# Patient Record
Sex: MALE | Race: WHITE | NOT HISPANIC OR LATINO | Employment: OTHER | URBAN - METROPOLITAN AREA
[De-identification: names, ages, dates, MRNs, and addresses within clinical notes are randomized per-mention and may not be internally consistent; named-entity substitution may affect disease eponyms.]

---

## 2017-03-07 ENCOUNTER — OFFICE VISIT - HEALTHEAST (OUTPATIENT)
Dept: CARDIOLOGY | Facility: CLINIC | Age: 72
End: 2017-03-07

## 2017-03-07 DIAGNOSIS — I25.10 CORONARY ARTERY DISEASE INVOLVING NATIVE CORONARY ARTERY OF NATIVE HEART WITHOUT ANGINA PECTORIS: ICD-10-CM

## 2017-03-07 ASSESSMENT — MIFFLIN-ST. JEOR: SCORE: 1494.69

## 2017-04-07 ENCOUNTER — OFFICE VISIT - HEALTHEAST (OUTPATIENT)
Dept: FAMILY MEDICINE | Facility: CLINIC | Age: 72
End: 2017-04-07

## 2017-04-07 DIAGNOSIS — C61 PROSTATE CANCER (H): ICD-10-CM

## 2017-04-07 DIAGNOSIS — Z00.00 HEALTH CARE MAINTENANCE: ICD-10-CM

## 2017-04-07 DIAGNOSIS — I25.10 CORONARY ARTERY DISEASE INVOLVING NATIVE CORONARY ARTERY OF NATIVE HEART WITHOUT ANGINA PECTORIS: ICD-10-CM

## 2017-04-07 DIAGNOSIS — K21.9 GASTROESOPHAGEAL REFLUX DISEASE WITHOUT ESOPHAGITIS: ICD-10-CM

## 2017-04-07 LAB
CHOLEST SERPL-MCNC: 133 MG/DL
FASTING STATUS PATIENT QL REPORTED: YES
HDLC SERPL-MCNC: 45 MG/DL
LDLC SERPL CALC-MCNC: 67 MG/DL
TRIGL SERPL-MCNC: 105 MG/DL

## 2017-04-07 ASSESSMENT — MIFFLIN-ST. JEOR: SCORE: 1464.07

## 2017-04-24 ENCOUNTER — RECORDS - HEALTHEAST (OUTPATIENT)
Dept: ADMINISTRATIVE | Facility: OTHER | Age: 72
End: 2017-04-24

## 2017-07-17 ENCOUNTER — COMMUNICATION - HEALTHEAST (OUTPATIENT)
Dept: FAMILY MEDICINE | Facility: CLINIC | Age: 72
End: 2017-07-17

## 2017-07-17 DIAGNOSIS — E78.5 HYPERLIPEMIA: ICD-10-CM

## 2017-08-31 ENCOUNTER — RECORDS - HEALTHEAST (OUTPATIENT)
Dept: ADMINISTRATIVE | Facility: OTHER | Age: 72
End: 2017-08-31

## 2017-10-17 ENCOUNTER — RECORDS - HEALTHEAST (OUTPATIENT)
Dept: ADMINISTRATIVE | Facility: OTHER | Age: 72
End: 2017-10-17

## 2017-10-20 ENCOUNTER — AMBULATORY - HEALTHEAST (OUTPATIENT)
Dept: NURSING | Facility: CLINIC | Age: 72
End: 2017-10-20

## 2017-10-26 ENCOUNTER — RECORDS - HEALTHEAST (OUTPATIENT)
Dept: ADMINISTRATIVE | Facility: OTHER | Age: 72
End: 2017-10-26

## 2018-01-25 ENCOUNTER — COMMUNICATION - HEALTHEAST (OUTPATIENT)
Dept: ADMINISTRATIVE | Facility: CLINIC | Age: 73
End: 2018-01-25

## 2018-04-02 ENCOUNTER — OFFICE VISIT - HEALTHEAST (OUTPATIENT)
Dept: CARDIOLOGY | Facility: CLINIC | Age: 73
End: 2018-04-02

## 2018-04-02 DIAGNOSIS — I25.10 CORONARY ARTERY DISEASE INVOLVING NATIVE CORONARY ARTERY OF NATIVE HEART WITHOUT ANGINA PECTORIS: ICD-10-CM

## 2018-04-02 ASSESSMENT — MIFFLIN-ST. JEOR: SCORE: 1491.28

## 2018-04-10 ENCOUNTER — OFFICE VISIT - HEALTHEAST (OUTPATIENT)
Dept: FAMILY MEDICINE | Facility: CLINIC | Age: 73
End: 2018-04-10

## 2018-04-10 DIAGNOSIS — Z00.00 ROUTINE ADULT HEALTH MAINTENANCE: ICD-10-CM

## 2018-04-10 DIAGNOSIS — I25.10 CORONARY ARTERY DISEASE INVOLVING NATIVE CORONARY ARTERY OF NATIVE HEART WITHOUT ANGINA PECTORIS: ICD-10-CM

## 2018-04-10 DIAGNOSIS — G24.5 EYE TWITCH: ICD-10-CM

## 2018-04-10 DIAGNOSIS — K21.9 GASTROESOPHAGEAL REFLUX DISEASE WITHOUT ESOPHAGITIS: ICD-10-CM

## 2018-04-10 DIAGNOSIS — E78.5 HYPERLIPIDEMIA: ICD-10-CM

## 2018-04-10 DIAGNOSIS — Z00.01 ENCOUNTER FOR GENERAL ADULT MEDICAL EXAMINATION WITH ABNORMAL FINDINGS: ICD-10-CM

## 2018-04-10 DIAGNOSIS — E78.5 HYPERLIPEMIA: ICD-10-CM

## 2018-04-10 LAB
ALBUMIN SERPL-MCNC: 4 G/DL (ref 3.5–5)
ALP SERPL-CCNC: 64 U/L (ref 45–120)
ALT SERPL W P-5'-P-CCNC: 26 U/L (ref 0–45)
ANION GAP SERPL CALCULATED.3IONS-SCNC: 10 MMOL/L (ref 5–18)
AST SERPL W P-5'-P-CCNC: 26 U/L (ref 0–40)
BILIRUB SERPL-MCNC: 1 MG/DL (ref 0–1)
BUN SERPL-MCNC: 17 MG/DL (ref 8–28)
CALCIUM SERPL-MCNC: 9.6 MG/DL (ref 8.5–10.5)
CHLORIDE BLD-SCNC: 103 MMOL/L (ref 98–107)
CHOLEST SERPL-MCNC: 146 MG/DL
CO2 SERPL-SCNC: 27 MMOL/L (ref 22–31)
CREAT SERPL-MCNC: 0.94 MG/DL (ref 0.7–1.3)
ERYTHROCYTE [DISTWIDTH] IN BLOOD BY AUTOMATED COUNT: 11.4 % (ref 11–14.5)
FASTING STATUS PATIENT QL REPORTED: YES
GFR SERPL CREATININE-BSD FRML MDRD: >60 ML/MIN/1.73M2
GLUCOSE BLD-MCNC: 103 MG/DL (ref 70–125)
HCT VFR BLD AUTO: 48.2 % (ref 40–54)
HDLC SERPL-MCNC: 45 MG/DL
HGB BLD-MCNC: 16.4 G/DL (ref 14–18)
LDLC SERPL CALC-MCNC: 72 MG/DL
MCH RBC QN AUTO: 31.5 PG (ref 27–34)
MCHC RBC AUTO-ENTMCNC: 34.1 G/DL (ref 32–36)
MCV RBC AUTO: 92 FL (ref 80–100)
PLATELET # BLD AUTO: 198 THOU/UL (ref 140–440)
PMV BLD AUTO: 7.9 FL (ref 7–10)
POTASSIUM BLD-SCNC: 4 MMOL/L (ref 3.5–5)
PROT SERPL-MCNC: 7.1 G/DL (ref 6–8)
RBC # BLD AUTO: 5.21 MILL/UL (ref 4.4–6.2)
SODIUM SERPL-SCNC: 140 MMOL/L (ref 136–145)
TRIGL SERPL-MCNC: 144 MG/DL
WBC: 7.4 THOU/UL (ref 4–11)

## 2018-04-10 ASSESSMENT — MIFFLIN-ST. JEOR: SCORE: 1469.32

## 2018-04-23 ENCOUNTER — RECORDS - HEALTHEAST (OUTPATIENT)
Dept: ADMINISTRATIVE | Facility: OTHER | Age: 73
End: 2018-04-23

## 2018-10-22 ENCOUNTER — RECORDS - HEALTHEAST (OUTPATIENT)
Dept: ADMINISTRATIVE | Facility: OTHER | Age: 73
End: 2018-10-22

## 2018-10-26 ENCOUNTER — AMBULATORY - HEALTHEAST (OUTPATIENT)
Dept: NURSING | Facility: CLINIC | Age: 73
End: 2018-10-26

## 2018-10-29 ENCOUNTER — RECORDS - HEALTHEAST (OUTPATIENT)
Dept: ADMINISTRATIVE | Facility: OTHER | Age: 73
End: 2018-10-29

## 2019-01-29 ENCOUNTER — COMMUNICATION - HEALTHEAST (OUTPATIENT)
Dept: FAMILY MEDICINE | Facility: CLINIC | Age: 74
End: 2019-01-29

## 2019-01-29 DIAGNOSIS — E78.5 HYPERLIPEMIA: ICD-10-CM

## 2019-04-12 ENCOUNTER — OFFICE VISIT - HEALTHEAST (OUTPATIENT)
Dept: FAMILY MEDICINE | Facility: CLINIC | Age: 74
End: 2019-04-12

## 2019-04-12 DIAGNOSIS — Z00.00 ROUTINE HISTORY AND PHYSICAL EXAMINATION OF ADULT: ICD-10-CM

## 2019-04-12 DIAGNOSIS — Z12.11 SCREEN FOR COLON CANCER: ICD-10-CM

## 2019-04-12 DIAGNOSIS — Z12.5 ENCOUNTER FOR SCREENING FOR MALIGNANT NEOPLASM OF PROSTATE: ICD-10-CM

## 2019-04-12 DIAGNOSIS — E78.5 HYPERLIPEMIA: ICD-10-CM

## 2019-04-12 DIAGNOSIS — C61 PROSTATE CANCER (H): ICD-10-CM

## 2019-04-12 LAB
ALBUMIN SERPL-MCNC: 3.9 G/DL (ref 3.5–5)
ALP SERPL-CCNC: 66 U/L (ref 45–120)
ALT SERPL W P-5'-P-CCNC: 23 U/L (ref 0–45)
ANION GAP SERPL CALCULATED.3IONS-SCNC: 9 MMOL/L (ref 5–18)
AST SERPL W P-5'-P-CCNC: 26 U/L (ref 0–40)
BILIRUB SERPL-MCNC: 0.7 MG/DL (ref 0–1)
BUN SERPL-MCNC: 15 MG/DL (ref 8–28)
CALCIUM SERPL-MCNC: 9.6 MG/DL (ref 8.5–10.5)
CHLORIDE BLD-SCNC: 107 MMOL/L (ref 98–107)
CHOLEST SERPL-MCNC: 113 MG/DL
CO2 SERPL-SCNC: 26 MMOL/L (ref 22–31)
CREAT SERPL-MCNC: 0.94 MG/DL (ref 0.7–1.3)
ERYTHROCYTE [DISTWIDTH] IN BLOOD BY AUTOMATED COUNT: 10.9 % (ref 11–14.5)
FASTING STATUS PATIENT QL REPORTED: YES
GFR SERPL CREATININE-BSD FRML MDRD: >60 ML/MIN/1.73M2
GLUCOSE BLD-MCNC: 101 MG/DL (ref 70–125)
HCT VFR BLD AUTO: 44.4 % (ref 40–54)
HDLC SERPL-MCNC: 38 MG/DL
HGB BLD-MCNC: 15.2 G/DL (ref 14–18)
LDLC SERPL CALC-MCNC: 58 MG/DL
MCH RBC QN AUTO: 31.8 PG (ref 27–34)
MCHC RBC AUTO-ENTMCNC: 34.3 G/DL (ref 32–36)
MCV RBC AUTO: 93 FL (ref 80–100)
PLATELET # BLD AUTO: 185 THOU/UL (ref 140–440)
PMV BLD AUTO: 8.1 FL (ref 7–10)
POTASSIUM BLD-SCNC: 4.5 MMOL/L (ref 3.5–5)
PROT SERPL-MCNC: 6.7 G/DL (ref 6–8)
PSA SERPL-MCNC: <0.1 NG/ML (ref 0–6.5)
RBC # BLD AUTO: 4.79 MILL/UL (ref 4.4–6.2)
SODIUM SERPL-SCNC: 142 MMOL/L (ref 136–145)
TRIGL SERPL-MCNC: 83 MG/DL
WBC: 5.4 THOU/UL (ref 4–11)

## 2019-04-12 ASSESSMENT — MIFFLIN-ST. JEOR: SCORE: 1473.14

## 2019-04-22 ENCOUNTER — RECORDS - HEALTHEAST (OUTPATIENT)
Dept: ADMINISTRATIVE | Facility: OTHER | Age: 74
End: 2019-04-22

## 2019-04-22 LAB — COLOGUARD-ABSTRACT: NEGATIVE

## 2019-05-10 ENCOUNTER — RECORDS - HEALTHEAST (OUTPATIENT)
Dept: HEALTH INFORMATION MANAGEMENT | Facility: CLINIC | Age: 74
End: 2019-05-10

## 2019-09-26 ENCOUNTER — AMBULATORY - HEALTHEAST (OUTPATIENT)
Dept: NURSING | Facility: CLINIC | Age: 74
End: 2019-09-26

## 2020-05-04 ENCOUNTER — COMMUNICATION - HEALTHEAST (OUTPATIENT)
Dept: FAMILY MEDICINE | Facility: CLINIC | Age: 75
End: 2020-05-04

## 2020-05-04 ENCOUNTER — COMMUNICATION - HEALTHEAST (OUTPATIENT)
Dept: ADMINISTRATIVE | Facility: CLINIC | Age: 75
End: 2020-05-04

## 2020-05-04 DIAGNOSIS — E78.5 HYPERLIPEMIA: ICD-10-CM

## 2020-05-07 ENCOUNTER — AMBULATORY - HEALTHEAST (OUTPATIENT)
Dept: FAMILY MEDICINE | Facility: CLINIC | Age: 75
End: 2020-05-07

## 2020-05-07 ENCOUNTER — COMMUNICATION - HEALTHEAST (OUTPATIENT)
Dept: FAMILY MEDICINE | Facility: CLINIC | Age: 75
End: 2020-05-07

## 2020-05-07 DIAGNOSIS — E78.5 HYPERLIPEMIA: ICD-10-CM

## 2020-08-12 ENCOUNTER — OFFICE VISIT - HEALTHEAST (OUTPATIENT)
Dept: FAMILY MEDICINE | Facility: CLINIC | Age: 75
End: 2020-08-12

## 2020-08-12 DIAGNOSIS — Z12.5 SCREENING FOR PROSTATE CANCER: ICD-10-CM

## 2020-08-12 DIAGNOSIS — I25.10 CORONARY ARTERY DISEASE INVOLVING NATIVE HEART WITHOUT ANGINA PECTORIS, UNSPECIFIED VESSEL OR LESION TYPE: ICD-10-CM

## 2020-08-12 DIAGNOSIS — K21.9 GASTROESOPHAGEAL REFLUX DISEASE, ESOPHAGITIS PRESENCE NOT SPECIFIED: ICD-10-CM

## 2020-08-12 DIAGNOSIS — C61 PROSTATE CANCER (H): ICD-10-CM

## 2020-08-12 DIAGNOSIS — I25.10 CORONARY ARTERY DISEASE INVOLVING NATIVE CORONARY ARTERY: ICD-10-CM

## 2020-08-12 DIAGNOSIS — Z00.00 ROUTINE HISTORY AND PHYSICAL EXAMINATION OF ADULT: ICD-10-CM

## 2020-08-12 DIAGNOSIS — M25.562 LEFT KNEE PAIN, UNSPECIFIED CHRONICITY: ICD-10-CM

## 2020-08-12 LAB
ALBUMIN SERPL-MCNC: 4.3 G/DL (ref 3.5–5)
ALP SERPL-CCNC: 66 U/L (ref 45–120)
ALT SERPL W P-5'-P-CCNC: 24 U/L (ref 0–45)
ANION GAP SERPL CALCULATED.3IONS-SCNC: 13 MMOL/L (ref 5–18)
AST SERPL W P-5'-P-CCNC: 25 U/L (ref 0–40)
BILIRUB SERPL-MCNC: 0.8 MG/DL (ref 0–1)
BUN SERPL-MCNC: 17 MG/DL (ref 8–28)
CALCIUM SERPL-MCNC: 9.8 MG/DL (ref 8.5–10.5)
CHLORIDE BLD-SCNC: 103 MMOL/L (ref 98–107)
CHOLEST SERPL-MCNC: 139 MG/DL
CO2 SERPL-SCNC: 23 MMOL/L (ref 22–31)
CREAT SERPL-MCNC: 1.01 MG/DL (ref 0.7–1.3)
ERYTHROCYTE [DISTWIDTH] IN BLOOD BY AUTOMATED COUNT: 11.3 % (ref 11–14.5)
FASTING STATUS PATIENT QL REPORTED: YES
GFR SERPL CREATININE-BSD FRML MDRD: >60 ML/MIN/1.73M2
GLUCOSE BLD-MCNC: 98 MG/DL (ref 70–125)
HCT VFR BLD AUTO: 45.8 % (ref 40–54)
HDLC SERPL-MCNC: 54 MG/DL
HGB BLD-MCNC: 15.6 G/DL (ref 14–18)
LDLC SERPL CALC-MCNC: 65 MG/DL
MCH RBC QN AUTO: 31.9 PG (ref 27–34)
MCHC RBC AUTO-ENTMCNC: 34 G/DL (ref 32–36)
MCV RBC AUTO: 94 FL (ref 80–100)
PLATELET # BLD AUTO: 181 THOU/UL (ref 140–440)
PMV BLD AUTO: 7.7 FL (ref 7–10)
POTASSIUM BLD-SCNC: 5.2 MMOL/L (ref 3.5–5)
PROT SERPL-MCNC: 7 G/DL (ref 6–8)
PSA SERPL-MCNC: <0.1 NG/ML (ref 0–6.5)
RBC # BLD AUTO: 4.88 MILL/UL (ref 4.4–6.2)
SODIUM SERPL-SCNC: 139 MMOL/L (ref 136–145)
TRIGL SERPL-MCNC: 102 MG/DL
WBC: 6.9 THOU/UL (ref 4–11)

## 2020-08-12 RX ORDER — NITROGLYCERIN 0.4 MG/1
0.4 TABLET SUBLINGUAL EVERY 5 MIN PRN
Qty: 90 TABLET | Refills: 0 | Status: SHIPPED | OUTPATIENT
Start: 2020-08-12

## 2020-08-12 ASSESSMENT — ANXIETY QUESTIONNAIRES
1. FEELING NERVOUS, ANXIOUS, OR ON EDGE: NOT AT ALL
2. NOT BEING ABLE TO STOP OR CONTROL WORRYING: NOT AT ALL

## 2020-08-12 ASSESSMENT — MIFFLIN-ST. JEOR: SCORE: 1440.71

## 2020-09-24 ENCOUNTER — AMBULATORY - HEALTHEAST (OUTPATIENT)
Dept: NURSING | Facility: CLINIC | Age: 75
End: 2020-09-24

## 2020-09-24 DIAGNOSIS — Z23 NEED FOR VACCINATION: ICD-10-CM

## 2020-09-24 DIAGNOSIS — Z23 NEED FOR PROPHYLACTIC VACCINATION AND INOCULATION AGAINST CHOLERA ALONE: ICD-10-CM

## 2020-11-30 ENCOUNTER — COMMUNICATION - HEALTHEAST (OUTPATIENT)
Dept: FAMILY MEDICINE | Facility: CLINIC | Age: 75
End: 2020-11-30

## 2020-11-30 DIAGNOSIS — E78.5 HYPERLIPEMIA: ICD-10-CM

## 2020-11-30 RX ORDER — ATORVASTATIN CALCIUM 40 MG/1
TABLET, FILM COATED ORAL
Qty: 90 TABLET | Refills: 2 | Status: SHIPPED | OUTPATIENT
Start: 2020-11-30 | End: 2021-08-13

## 2021-05-27 NOTE — PATIENT INSTRUCTIONS - HE
Advance Directive  Patient s advance directive was discussed and I am comfortable with the patient s wishes.  Patient Education   Personalized Prevention Plan  You are due for the preventive services outlined below.  Your care team is available to assist you in scheduling these services.  If you have already completed any of these items, please share that information with your care team to update in your medical record.  Health Maintenance   Topic Date Due     ZOSTER VACCINES (2 of 3) 04/30/2008     FALL RISK ASSESSMENT  04/10/2019     ADVANCE DIRECTIVES DISCUSSED WITH PATIENT  04/10/2023     TD 18+ HE  12/19/2024     COLONOSCOPY  08/31/2027     PNEUMOCOCCAL POLYSACCHARIDE VACCINE AGE 65 AND OVER  Completed     INFLUENZA VACCINE RULE BASED  Completed     PNEUMOCOCCAL CONJUGATE VACCINE FOR ADULTS (PCV13 OR PREVNAR)  Completed

## 2021-05-27 NOTE — PROGRESS NOTES
Assessment and Plan:       1. Routine history and physical examination of adult  Patient is fasting today and interested in fasting labs  He is up-to-date on colon cancer screening but interested in genetic testing  Is up-to-date on immunizations  He will check with insurance on coverage for the shingles vaccine  - Lipid Cascade  - Comprehensive Metabolic Panel  - HM2(CBC w/o Differential)  - PSA (Prostatic-Specific Antigen), Annual Screen    2. Encounter for screening for malignant neoplasm of prostate   Patient has a history of prostate cancer and is 5 years out from prostatectomy  We will continue to monitor PSA for verification that remains undetectable  - PSA (Prostatic-Specific Antigen), Annual Screen    3. Screen for colon cancer  Patient had a colonoscopy 2 years ago and is due for repeat colonoscopy next year  He like to pursue genetic testing instead of colonoscopy and we will try cologuard  - Cologuard    4. Hyperlipemia  Patient has a history of mixed hyperlipidemia  He is on atorvastatin and tolerating the check cholesterol levels and follow-up based on results  - atorvastatin (LIPITOR) 40 MG tablet; Take 1 tablet (40 mg total) by mouth at bedtime.  Dispense: 90 tablet; Refill: 3    5. Prostate cancer (H)  Patient has been following with urology over the last 5 years since surgery  We will check PSA for monitoring    The patient's current medical problems were reviewed.    The following high BMI interventions were performed this visit: encouragement to exercise  The following health maintenance schedule was reviewed with the patient and provided in printed form in the after visit summary:   Health Maintenance   Topic Date Due     ZOSTER VACCINES (2 of 3) 04/30/2008     FALL RISK ASSESSMENT  04/10/2019     ADVANCE DIRECTIVES DISCUSSED WITH PATIENT  04/10/2023     TD 18+ HE  12/19/2024     COLONOSCOPY  08/31/2027     PNEUMOCOCCAL POLYSACCHARIDE VACCINE AGE 65 AND OVER  Completed     INFLUENZA VACCINE RULE  BASED  Completed     PNEUMOCOCCAL CONJUGATE VACCINE FOR ADULTS (PCV13 OR PREVNAR)  Completed        Subjective:   Chief Complaint: Javier Johnson is an 74 y.o. male here for an Annual Wellness visit.   HPI: Patient is here for annual exam  Patient has no acute concerns  Is up-to-date on immunizations and will check with insurance on coverage for the shingles vaccine  Patient is on statin therapy and doing well with the medication we will check fasting labs today  Is up-to-date on colon cancer screening  Patient is exercising regularly  Patient spends his winter months in [place]  Patient recently has bought a new bike and plans on doing a lot of biking this summer    Review of Systems:   Please see above.  The rest of the review of systems are negative for all systems.    Patient Care Team:  Reza Yang MD as PCP - General     Patient Active Problem List   Diagnosis     Hyperplastic Polyp Of The Large Intestine     Hyperlipemia     Varicose Veins     Esophageal Reflux     Diverticulosis     Benign Prostatic Hypertrophy     Midback Pain     Nicotine Dependence - In Remission     Lower Back Pain     Inguinal Hernia     Prostate cancer (H)     Coronary artery disease involving native coronary artery     Abnormal stress test     Past Medical History:   Diagnosis Date     Cancer (H)      Exertional chest pain      GERD (gastroesophageal reflux disease)      Hyperlipidemia      Prostate cancer (H)       Past Surgical History:   Procedure Laterality Date     CARDIAC CATHETERIZATION  08/03/2016     CORONARY STENT PLACEMENT  08/03/2016    2 klaus to om     HERNIA REPAIR       PROSTATE SURGERY        Family History   Problem Relation Age of Onset     Stroke Mother      Coronary artery disease Father       Social History     Socioeconomic History     Marital status:      Spouse name: Not on file     Number of children: Not on file     Years of education: Not on file     Highest education level: Not on  file   Occupational History     Not on file   Social Needs     Financial resource strain: Not on file     Food insecurity:     Worry: Not on file     Inability: Not on file     Transportation needs:     Medical: Not on file     Non-medical: Not on file   Tobacco Use     Smoking status: Former Smoker     Last attempt to quit: 1991     Years since quittin.2     Smokeless tobacco: Never Used     Tobacco comment: quit    Substance and Sexual Activity     Alcohol use: Yes     Alcohol/week: 3.0 oz     Types: 1 Glasses of wine, 2 Cans of beer, 2 Standard drinks or equivalent per week     Drug use: No     Sexual activity: Not on file   Lifestyle     Physical activity:     Days per week: Not on file     Minutes per session: Not on file     Stress: Not on file   Relationships     Social connections:     Talks on phone: Not on file     Gets together: Not on file     Attends Sikhism service: Not on file     Active member of club or organization: Not on file     Attends meetings of clubs or organizations: Not on file     Relationship status: Not on file     Intimate partner violence:     Fear of current or ex partner: Not on file     Emotionally abused: Not on file     Physically abused: Not on file     Forced sexual activity: Not on file   Other Topics Concern     Not on file   Social History Narrative     Not on file      Current Outpatient Medications   Medication Sig Dispense Refill     ascorbic acid (ASCORBIC ACID WITH ARTUR HIPS) 500 MG tablet Take 1,000 mg by mouth.        aspirin 81 MG EC tablet Take 81 mg by mouth bedtime.        atorvastatin (LIPITOR) 40 MG tablet Take 1 tablet (40 mg total) by mouth at bedtime. 90 tablet 3     cholecalciferol, vitamin D3, 2,000 unit cap Take by mouth daily.        GLUCOSAMINE SULFATE (GLUCOSAMINE ORAL) Take 1 tablet by mouth daily.        MULTIVIT &MINERALS/FERROUS FUM (MULTI VITAMIN ORAL) Take 1 tablet by mouth daily.       nitroglycerin (NITROSTAT) 0.4 MG SL tablet  "Place 1 tablet (0.4 mg total) under the tongue every 5 (five) minutes as needed for chest pain. 90 tablet 12     omeprazole (PRILOSEC OTC) 20 MG tablet Take 20 mg by mouth daily.       No current facility-administered medications for this visit.       Objective:   Vital Signs:   Visit Vitals  /73 (Patient Site: Left Arm, Patient Position: Sitting, Cuff Size: Adult Regular)   Pulse 74   Temp 98  F (36.7  C) (Oral)   Resp 18   Ht 5' 10\" (1.778 m)   Wt 160 lb 4 oz (72.7 kg)   BMI 22.99 kg/m         VisionScreening:  No exam data present     PHYSICAL EXAM  Physical Examination: General appearance - alert, well appearing, and in no distress  Mental status - alert, oriented to person, place, and time  Ears - bilateral TM's and external ear canals normal  Mouth - mucous membranes moist, pharynx normal without lesions  Chest - clear to auscultation, no wheezes, rales or rhonchi, symmetric air entry  Heart - normal rate, regular rhythm, normal S1, S2, no murmurs, rubs, clicks or gallops  Abdomen - soft, nontender, nondistended, no masses or organomegaly  Back exam - full range of motion, no tenderness, palpable spasm or pain on motion  Neurological - alert, oriented, normal speech, no focal findings or movement disorder noted  Musculoskeletal - no joint tenderness, deformity or swelling  Extremities - peripheral pulses normal, no pedal edema, no clubbing or cyanosis  Skin - normal coloration and turgor, no rashes, no suspicious skin lesions noted      Assessment Results 4/12/2019   Activities of Daily Living No help needed   Instrumental Activities of Daily Living No help needed   Mini Cog Total Score 5   Some recent data might be hidden     A Mini-Cog score of 0-2 suggests the possibility of dementia, score of 3-5 suggests no dementia    Identified Health Risks:     Patient's advanced directive was discussed and I am comfortable with the patient's wishes.        "

## 2021-05-30 VITALS — WEIGHT: 165 LBS | BODY MASS INDEX: 23.62 KG/M2 | HEIGHT: 70 IN

## 2021-05-30 VITALS — WEIGHT: 160 LBS | HEIGHT: 70 IN | BODY MASS INDEX: 22.9 KG/M2

## 2021-06-01 VITALS — BODY MASS INDEX: 23.88 KG/M2 | HEIGHT: 69 IN | WEIGHT: 161.2 LBS

## 2021-06-01 VITALS — BODY MASS INDEX: 23.77 KG/M2 | WEIGHT: 166 LBS | HEIGHT: 70 IN

## 2021-06-02 VITALS — HEIGHT: 70 IN | WEIGHT: 160.25 LBS | BODY MASS INDEX: 22.94 KG/M2

## 2021-06-04 VITALS
RESPIRATION RATE: 14 BRPM | HEIGHT: 69 IN | DIASTOLIC BLOOD PRESSURE: 82 MMHG | BODY MASS INDEX: 23.19 KG/M2 | WEIGHT: 156.6 LBS | TEMPERATURE: 96.9 F | HEART RATE: 76 BPM | SYSTOLIC BLOOD PRESSURE: 144 MMHG

## 2021-06-07 NOTE — TELEPHONE ENCOUNTER
Refill Given    Refill given per Policy, patient informed they are overdue for Office Visit    Kristel Schmid, [facility] Connection Triage/Med Refill 5/6/2020    Requested Prescriptions   Pending Prescriptions Disp Refills     atorvastatin (LIPITOR) 40 MG tablet [Pharmacy Med Name: Atorvastatin Calcium 40 MG Oral Tablet] 90 tablet 0     Sig: TAKE 1 TABLET BY MOUTH AT BEDTIME       Statins Refill Protocol (Hmg CoA Reductase Inhibitors) Failed - 5/4/2020  9:22 AM        Failed - PCP or prescribing provider visit in past 12 months      Last office visit with prescriber/PCP: 8/11/2016 Reza Yang MD OR same dept: Visit date not found OR same specialty: 8/11/2016 Reza Yang MD  Last physical: 4/12/2019 Last MTM visit: Visit date not found   Next visit within 3 mo: Visit date not found  Next physical within 3 mo: Visit date not found  Prescriber OR PCP: Reza Yang MD  Last diagnosis associated with med order: 1. Hyperlipemia  - atorvastatin (LIPITOR) 40 MG tablet [Pharmacy Med Name: Atorvastatin Calcium 40 MG Oral Tablet]; TAKE 1 TABLET BY MOUTH AT BEDTIME  Dispense: 90 tablet; Refill: 0    If protocol passes may refill for 12 months if within 3 months of last provider visit (or a total of 15 months).

## 2021-06-08 NOTE — TELEPHONE ENCOUNTER
See medication refill encounter from yesterday (05/04/20)    Who is calling:  patient  Reason for Call:  States he was given a one month refill on his medication and since pcp's schedule has no openings for a telephone or clinic visit would pcp ok refills until his appointment in August for his annual wellness visit. If not please work into pcp's schedule and call patient.   Date of last appointment with primary care: 04/12/2019  Okay to leave a detailed message: Yes

## 2021-06-09 NOTE — PROGRESS NOTES
"Cardiology Progress Note    Assessment:  Coronary artery disease status post obtuse marginal stenting in August 2016, stable, no angina  Hyperlipidemia, on high-dose statin      Plan:  Lipid profile today    He can stop metoprolol    Follow-up in 1 year    Subjective:   This is 72 y.o. male who comes in today for follow-up visit.  He has done well since last time I saw him.  He has been physically active he denies chest pain or shortness of breath.  He denies any new cardiac problems.    Review of Systems:   General: WNL  Eyes: WNL  Ears/Nose/Throat: WNL  Lungs: WNL  Heart: WNL  Stomach: WNL  Bladder: WNL  Muscle/Joints: WNL  Skin: WNL  Nervous System: WNL  Mental Health: WNL     Blood: WNL    Objective:   Visit Vitals     /72 (Patient Site: Right Arm, Patient Position: Sitting, Cuff Size: Adult Regular)     Pulse 80     Resp 16     Ht 5' 10\" (1.778 m)     Wt 165 lb (74.8 kg)     BMI 23.68 kg/m2     Physical Exam:  GENERAL: no distress  NECK: No JVD  LUNGS: Clear to auscultation.  CARDIAC: regular rhythm, S1 & S2 normal.  No heaves, thrills, gallops or murmurs.  ABDOMEN: flat, negative hepatosplenomegaly, soft and non-tender.  EXTREMITIES: No evidence of cyanosis, clubbing or edema.    Current Outpatient Prescriptions   Medication Sig Dispense Refill     ascorbic acid (ASCORBIC ACID WITH ARTUR HIPS) 500 MG tablet Take 1,000 mg by mouth.        aspirin 81 MG EC tablet Take 81 mg by mouth bedtime.        atorvastatin (LIPITOR) 40 MG tablet Take 1 tablet (40 mg total) by mouth bedtime. 30 tablet 11     cholecalciferol, vitamin D3, 2,000 unit cap Take by mouth daily.        GLUCOSAMINE SULFATE (GLUCOSAMINE ORAL) Take 1 tablet by mouth daily.        MULTIVIT &MINERALS/FERROUS FUM (MULTI VITAMIN ORAL) Take 1 tablet by mouth daily.       nitroglycerin (NITROSTAT) 0.4 MG SL tablet Place 1 tablet (0.4 mg total) under the tongue every 5 (five) minutes as needed for chest pain. 90 tablet 12     OMEGA-3/DHA/EPA/FISH OIL " (FISH OIL-OMEGA-3 FATTY ACIDS) 300-1,000 mg capsule Take 1,000 mg by mouth 2 (two) times a day.        omeprazole (PRILOSEC OTC) 20 MG tablet Take 20 mg by mouth daily.       No current facility-administered medications for this visit.        Cardiographics:      Coronary angio: August 2016 1v CAD.  99% OM1 stenosis with CINTHYA grade 2 flow.  Normal LV systolic function with a visually estimated EF  60-65%.  Mild Mitral regurgitation.  Inferolateral hypokinesis.  Successful Synergy KEN stent of OM1.  Lab Results:       Lab Results   Component Value Date    CHOL 133 10/27/2016    CHOL 154 08/04/2016    CHOL 175 04/06/2016     Lab Results   Component Value Date    HDL 45 10/27/2016    HDL 40 08/04/2016    HDL 41 04/06/2016     Lab Results   Component Value Date    LDLCALC 69 10/27/2016    LDLCALC 86 08/04/2016    LDLCALC 112 04/06/2016     Lab Results   Component Value Date    TRIG 94 10/27/2016    TRIG 140 08/04/2016    TRIG 109 04/06/2016     No components found for: CHOLHDL  No results found for: BNP    Barrie (Ryan)  MD Jaylen

## 2021-06-09 NOTE — PROGRESS NOTES
ASSESSMENT & PLAN:    1. Health care maintenance  Check labs today and follow-up based on results  Continue with regular exercise  Reviewed recent cardiology notes  Due for colonoscopy in the next year  - Lipid Cascade  - Comprehensive Metabolic Panel  - HM2(CBC w/o Differential)    2. Coronary artery disease involving native coronary artery of native heart without angina pectoris  Reviewed recent cardiology notes  Patient is no longer on a beta-blocker per cardiology recommendation  Continue a statin at this time  Continue with healthy eating and regular exercise    3. Prostate cancer  Patient follows with urology still at this time will defer PSA testing to them    4. Gastroesophageal reflux disease without esophagitis  Patient is doing well with current dosing of omeprazole continue with this medication at this time  Consider trials off medication to see if still needed in the future      Patient is up-to-date with colonoscopy, and immunizations    There are no Patient Instructions on file for this visit.    Orders Placed This Encounter   Procedures     Lipid Cascade     Order Specific Question:   Fasting is required?     Answer:   Yes     Comprehensive Metabolic Panel     HM2(CBC w/o Differential)     There are no discontinued medications.    No Follow-up on file.    CHIEF COMPLAINT:  Chief Complaint   Patient presents with     Annual Exam     Physical exam - fasting labs, plans to have colonoscopy in 8/2017       HISTORY OF PRESENT ILLNESS:  Javier is a 72 y.o. male presenting to the clinic today for a physical examination. He does not have any acute concerns today.     Health Maintenance: He is due for a colonoscopy this year and plans to do it in August. He has his PSA checked by [facility] and has one scheduled for the end of the month.     REVIEW OF SYSTEMS:   He would like to do fasting labs today. He continues to take 40 mg of atorvastatin daily. His cholesterol values have been in a good range in the  past few lipid panels checked. He has about a 3:1 total cholesterol to HDL ratio. He has felt good since his stent placement in 2016. He was able to discontinue metoprolol last month by recommendation from his cardiologist. He has felt better since discontinuing the medication. He has not had any trouble with reflux while taking 20 mg of omeprazole daily. He notices some discomfort if he forgets to take it before drinking his morning coffee. He thinks he would be able discontinue it if he did not drink coffee, but he has not experimented with this. He has not had any shoulder discomfort from his exercise routine. He denies hip pain as well. He occasionally has some discomfort in the lumbar musculature and in the region of the spinal process in the lumbar region when he bends forward or twists the wrong way. He thinks it is mostly muscular. He does not have symptoms moving down the legs. All other systems are negative.     PFSH:  He is swimming and biking for exercise. He has a regular exercise routine that is working well for him. He is going to [place] with his wife this fall.     Social History     Social History     Marital status:      Spouse name: N/A     Number of children: N/A     Years of education: N/A     Occupational History     Not on file.     Social History Main Topics     Smoking status: Former Smoker     Quit date: 1/1/1991     Smokeless tobacco: Never Used      Comment: quit 1990     Alcohol use 3.0 oz/week     1 Glasses of wine, 2 Cans of beer, 2 Standard drinks or equivalent per week     Drug use: No     Sexual activity: Not on file     Other Topics Concern     Not on file     Social History Narrative       Past Medical History:   Diagnosis Date     Cancer      Exertional chest pain      GERD (gastroesophageal reflux disease)      Hyperlipidemia      Prostate cancer        Past Surgical History:   Procedure Laterality Date     CARDIAC CATHETERIZATION  08/03/2016     CORONARY STENT PLACEMENT   "08/03/2016    2 klaus to om     HERNIA REPAIR       PROSTATE SURGERY         Allergies   Allergen Reactions     Amoxicillin Itching     Codeine      Nausea       Active Ambulatory Problems     Diagnosis Date Noted     Hyperplastic Polyp Of The Large Intestine      Hyperlipemia      Varicose Veins      Esophageal Reflux      Diverticulosis      Benign Prostatic Hypertrophy      Midback Pain      Nicotine Dependence - In Remission      Lower Back Pain      Inguinal Hernia      Prostate cancer 03/31/2015     Coronary artery disease involving native coronary artery 07/27/2016     Abnormal stress test 08/03/2016     Resolved Ambulatory Problems     Diagnosis Date Noted     No Resolved Ambulatory Problems     Past Medical History:   Diagnosis Date     Cancer      Exertional chest pain      GERD (gastroesophageal reflux disease)      Hyperlipidemia      Prostate cancer        Family History   Problem Relation Age of Onset     Stroke Mother      Coronary artery disease Father        VITALS:  Vitals:    04/07/17 0737   BP: 106/74   Patient Site: Left Arm   Patient Position: Sitting   Cuff Size: Adult Regular   Pulse: 80   Resp: 16   Temp: 97.8  F (36.6  C)   TempSrc: Oral   Weight: 160 lb (72.6 kg)   Height: 5' 9.5\" (1.765 m)     Wt Readings from Last 3 Encounters:   04/07/17 160 lb (72.6 kg)   03/07/17 165 lb (74.8 kg)   10/05/16 160 lb 12.8 oz (72.9 kg)       PHYSICAL EXAM:  GENERAL APPEARANCE: Alert, cooperative, no distress, appears stated age   LUNGS: Clear to auscultation bilaterally, respirations unlabored   HEART: Regular rate and rhythm, S1 and S2 normal, no murmur, rub, or gallop. No lower extremity edema.   SKIN: Scattered cherry hemangiomas  EYES: PERRL, conjunctiva/corneas clear, EOM's intact   EARS: Normal TM's and external ear canals, both ears   NOSE: Nares normal, mucosa normal, no drainage   THROAT: Lips, mucosa, and tongue normal; teeth and gums normal   BACK: Symmetric, no curvature, ROM normal, no CVA " tenderness   ABDOMEN: Soft, non-tender, bowel sounds active all four quadrants, no masses, no organomegaly   MUSCULOSKELETAL: Some tenderness in the lumbar musculature and in the region of the spinal process in the lumbar region  EXTREMITIES: Extremities normal, atraumatic, no cyanosis  LYMPH NODES: Cervical and supraclavicular nodes normal   NEUROLOGIC: CNII-XII intact. Normal strength, sensation and reflexes   throughout   PSYCHIATRIC: Normal mood and affect.    ADDITIONAL HISTORY SUMMARIZED (FROM OLD RECORDS OR HISTORY FROM SOMEONE OTHER THAN THE PATIENT OR ANOTHER HEALTHCARE PROVIDER) (2 TOTAL): Reviewed 3/7/2017 cardiology note regarding CAD and medications    DECISION TO OBTAIN EXTRA INFORMATION (OLD RECORDS REQUESTED OR HISTORY FROM ANOTHER PERSON OR ACCESSING CARE EVERYWHERE) (1 TOTAL): None.     RADIOLOGY TESTS SUMMARIZED OR ORDERED (XRAY/CT/MRI/DXA) (1 TOTAL): None.    LABS REVIEWED OR ORDERED (1 TOTAL): Labs from 8/4/2016 reviewed. Labs ordered.     MEDICINE TESTS SUMMARIZED OR ORDERED (EKG/ECHO) (1 TOTAL): None.    INDEPENDENT REVIEW OF EKG OR X-RAY (2 EACH): None.     The visit lasted a total of 17 minutes face to face with the patient. Over 50% of the time was spent counseling and educating the patient about general health maintenance.    I, Uziel Ramirez, am scribing for and in the presence of, Dr. Ynag.    I, Dr. Yang, personally performed the services described in this documentation, as scribed by Uziel Ramirez in my presence, and it is both accurate and complete.    MEDICATIONS:  Current Outpatient Prescriptions   Medication Sig Dispense Refill     ascorbic acid (ASCORBIC ACID WITH ARTUR HIPS) 500 MG tablet Take 1,000 mg by mouth.        aspirin 81 MG EC tablet Take 81 mg by mouth bedtime.        atorvastatin (LIPITOR) 40 MG tablet Take 1 tablet (40 mg total) by mouth bedtime. 30 tablet 11     cholecalciferol, vitamin D3, 2,000 unit cap Take by mouth daily.        GLUCOSAMINE SULFATE  (GLUCOSAMINE ORAL) Take 1 tablet by mouth daily.        MULTIVIT &MINERALS/FERROUS FUM (MULTI VITAMIN ORAL) Take 1 tablet by mouth daily.       nitroglycerin (NITROSTAT) 0.4 MG SL tablet Place 1 tablet (0.4 mg total) under the tongue every 5 (five) minutes as needed for chest pain. 90 tablet 12     OMEGA-3/DHA/EPA/FISH OIL (FISH OIL-OMEGA-3 FATTY ACIDS) 300-1,000 mg capsule Take 1,000 mg by mouth 2 (two) times a day.        omeprazole (PRILOSEC OTC) 20 MG tablet Take 20 mg by mouth daily.       No current facility-administered medications for this visit.          Total Data Points: 3

## 2021-06-10 NOTE — PROGRESS NOTES
Assessment and Plan:       1. Routine history and physical examination of adult  Will obtain fasting labs and f/u based on results  - Comprehensive Metabolic Panel  - HM2(CBC w/o Differential)  - Lipid Cascade    2. Screening for prostate cancer  Hx of cancer- obtain PSA for monitoring  - PSA, Annual Screen (Prostatic-Specific Antigen)    3. Left knee pain, unspecified chronicity  Pt would like to see ortho- referral placed    4. Coronary artery disease involving native heart without angina pectoris, unspecified vessel or lesion type  Pt is on statin- goal is for LDL around 70 or lower    5. Coronary artery disease involving native coronary artery  Please see above  - nitroglycerin (NITROSTAT) 0.4 MG SL tablet; Place 1 tablet (0.4 mg total) under the tongue every 5 (five) minutes as needed for chest pain.  Dispense: 90 tablet; Refill: 0    6. Prostate cancer (H)  Pt is greater than 5 years out- we will continue to monitor yearly with PSA    7. Gastroesophageal reflux disease, esophagitis presence not specified  Doing well with PPI- will continue with current dosing     The patient's current medical problems were reviewed.    The following high BMI interventions were performed this visit: encouragement to exercise  The following health maintenance schedule was reviewed with the patient and provided in printed form in the after visit summary:   Health Maintenance   Topic Date Due     HEPATITIS C SCREENING  1945     ZOSTER VACCINES (2 of 3) 04/30/2008     MEDICARE ANNUAL WELLNESS VISIT  04/12/2020     INFLUENZA VACCINE RULE BASED (1) 08/01/2020     FALL RISK ASSESSMENT  08/12/2021     LIPID  04/12/2024     ADVANCE CARE PLANNING  04/12/2024     TD 18+ HE  12/19/2024     COLORECTAL CANCER SCREENING  08/31/2027     PNEUMOCOCCAL IMMUNIZATION 65+ LOW/MEDIUM RISK  Completed     HEPATITIS B VACCINES  Aged Out        Subjective:   Chief Complaint: Javier Johnson is an 75 y.o. male here for an Annual Wellness visit.    HPI:  PT is here for annual exam.  Has been having increasing knee pain and would like referral to ortho for further treatment.  Pt has hx of CAD- on statin and goal of LDL 70 or less.  UTD on immunizations.  Greater than 5 years out from prostate cancer surgery- we will continue to monitor PSA levels.  Cont with PPI      Review of Systems:   Please see above.  The rest of the review of systems are negative for all systems.    Patient Care Team:  Reza Yang MD as PCP - General  Reza Yang MD as Assigned PCP     Patient Active Problem List   Diagnosis     Hyperplastic Polyp Of The Large Intestine     Hyperlipemia     Varicose Veins     Esophageal Reflux     Diverticulosis     Benign Prostatic Hypertrophy     Midback Pain     Nicotine Dependence - In Remission     Lower Back Pain     Inguinal Hernia     Prostate cancer (H)     Coronary artery disease involving native coronary artery     Abnormal stress test     Past Medical History:   Diagnosis Date     Cancer (H)      Exertional chest pain      GERD (gastroesophageal reflux disease)      Hyperlipidemia      Prostate cancer (H)       Past Surgical History:   Procedure Laterality Date     CARDIAC CATHETERIZATION  08/03/2016     CORONARY STENT PLACEMENT  08/03/2016    2 klaus to om     HERNIA REPAIR       PROSTATE SURGERY        Family History   Problem Relation Age of Onset     Stroke Mother      Coronary artery disease Father       Social History     Socioeconomic History     Marital status:      Spouse name: Not on file     Number of children: Not on file     Years of education: Not on file     Highest education level: Not on file   Occupational History     Not on file   Social Needs     Financial resource strain: Not on file     Food insecurity     Worry: Not on file     Inability: Not on file     Transportation needs     Medical: Not on file     Non-medical: Not on file   Tobacco Use     Smoking status: Former Smoker     Last attempt  to quit: 1991     Years since quittin.6     Smokeless tobacco: Never Used     Tobacco comment: quit    Substance and Sexual Activity     Alcohol use: Yes     Alcohol/week: 5.0 standard drinks     Types: 1 Glasses of wine, 2 Cans of beer, 2 Standard drinks or equivalent per week     Drug use: No     Sexual activity: Not on file   Lifestyle     Physical activity     Days per week: Not on file     Minutes per session: Not on file     Stress: Not on file   Relationships     Social connections     Talks on phone: Not on file     Gets together: Not on file     Attends [employer] service: Not on file     Active member of club or organization: Not on file     Attends meetings of clubs or organizations: Not on file     Relationship status: Not on file     Intimate partner violence     Fear of current or ex partner: Not on file     Emotionally abused: Not on file     Physically abused: Not on file     Forced sexual activity: Not on file   Other Topics Concern     Not on file   Social History Narrative     Not on file      Current Outpatient Medications   Medication Sig Dispense Refill     ascorbic acid (ASCORBIC ACID WITH ARTUR HIPS) 500 MG tablet Take 1,000 mg by mouth.        aspirin 81 MG EC tablet Take 81 mg by mouth bedtime.        atorvastatin (LIPITOR) 40 MG tablet Take 1 tablet (40 mg total) by mouth at bedtime. 90 tablet 1     cholecalciferol, vitamin D3, 2,000 unit cap Take by mouth daily.        GLUCOSAMINE SULFATE (GLUCOSAMINE ORAL) Take 1 tablet by mouth daily.        MULTIVIT &MINERALS/FERROUS FUM (MULTI VITAMIN ORAL) Take 1 tablet by mouth daily.       nitroglycerin (NITROSTAT) 0.4 MG SL tablet Place 1 tablet (0.4 mg total) under the tongue every 5 (five) minutes as needed for chest pain. 90 tablet 0     omeprazole (PRILOSEC OTC) 20 MG tablet Take 20 mg by mouth daily.       No current facility-administered medications for this visit.       Objective:   Vital Signs:   Visit Vitals  /82 (Patient  "Site: Left Arm, Patient Position: Sitting, Cuff Size: Adult Regular)   Pulse 76   Temp 96.9  F (36.1  C) (Oral)   Resp 14   Ht 5' 9\" (1.753 m)   Wt 156 lb 9.6 oz (71 kg)   BMI 23.13 kg/m           VisionScreening:  No exam data present     PHYSICAL EXAM  Physical Examination: General appearance - alert, well appearing, and in no distress  Eyes - pupils equal and reactive, extraocular eye movements intact  Mouth - mucous membranes moist, pharynx normal without lesions  Chest - clear to auscultation, no wheezes, rales or rhonchi, symmetric air entry  Heart - normal rate, regular rhythm, normal S1, S2, no murmurs, rubs, clicks or gallops  Abdomen - soft, nontender, nondistended, no masses or organomegaly  Back exam - full range of motion, no tenderness, palpable spasm or pain on motion  Neurological - alert, oriented, normal speech, no focal findings or movement disorder noted  Musculoskeletal - no joint tenderness, deformity or swelling  Extremities - peripheral pulses normal, no pedal edema, no clubbing or cyanosis  Skin - normal coloration and turgor, no rashes, no suspicious skin lesions noted      Assessment Results 8/12/2020   Activities of Daily Living No help needed   Instrumental Activities of Daily Living No help needed   Mini Cog Total Score 5   Some recent data might be hidden     A Mini-Cog score of 0-2 suggests the possibility of dementia, score of 3-5 suggests no dementia    Identified Health Risks:     Patient's advanced directive was discussed and I am comfortable with the patient's wishes.        "

## 2021-06-10 NOTE — PATIENT INSTRUCTIONS - HE
Advance Directive  Patient s advance directive was discussed and I am comfortable with the patient s wishes.  Patient Education   Personalized Prevention Plan  You are due for the preventive services outlined below.  Your care team is available to assist you in scheduling these services.  If you have already completed any of these items, please share that information with your care team to update in your medical record.  Health Maintenance   Topic Date Due     HEPATITIS C SCREENING  1945     ZOSTER VACCINES (2 of 3) 04/30/2008     MEDICARE ANNUAL WELLNESS VISIT  04/12/2020     INFLUENZA VACCINE RULE BASED (1) 08/01/2020     FALL RISK ASSESSMENT  08/12/2021     LIPID  04/12/2024     ADVANCE CARE PLANNING  04/12/2024     TD 18+ HE  12/19/2024     COLORECTAL CANCER SCREENING  08/31/2027     PNEUMOCOCCAL IMMUNIZATION 65+ LOW/MEDIUM RISK  Completed     HEPATITIS B VACCINES  Aged Out

## 2021-06-13 NOTE — TELEPHONE ENCOUNTER
Refill Approved    Rx renewed per Medication Renewal Policy. Medication was last renewed on 5/7/20.    [facility] Parkinson, [facility] Connection Triage/Med Refill 11/30/2020     Requested Prescriptions   Pending Prescriptions Disp Refills     atorvastatin (LIPITOR) 40 MG tablet [Pharmacy Med Name: Atorvastatin Calcium 40 MG Oral Tablet] 90 tablet 0     Sig: TAKE 1 TABLET BY MOUTH AT BEDTIME       Statins Refill Protocol (Hmg CoA Reductase Inhibitors) Passed - 11/30/2020  7:48 AM        Passed - PCP or prescribing provider visit in past 12 months      Last office visit with prescriber/PCP: Visit date not found OR same dept: Visit date not found OR same specialty: Visit date not found  Last physical: 8/12/2020 Last MTM visit: Visit date not found   Next visit within 3 mo: Visit date not found  Next physical within 3 mo: Visit date not found  Prescriber OR PCP: Reza Yang MD  Last diagnosis associated with med order: 1. Hyperlipemia  - atorvastatin (LIPITOR) 40 MG tablet [Pharmacy Med Name: Atorvastatin Calcium 40 MG Oral Tablet]; TAKE 1 TABLET BY MOUTH AT BEDTIME  Dispense: 90 tablet; Refill: 0    If protocol passes may refill for 12 months if within 3 months of last provider visit (or a total of 15 months).

## 2021-06-17 NOTE — PROGRESS NOTES
Assessment and Plan:       1. Hyperlipidemia  Check fasting labs today and follow-up based on results  - Lipid Cascade  - Comprehensive Metabolic Panel    2. Routine adult health maintenance  Patient is up-to-date on healthcare maintenance  Up-to-date on colon cancer screening  We will check with coverage for a new shingles vaccine  Follow based on lab results  - HM2(CBC w/o Differential)    3. Hyperlipemia  Reviewed recent cardiology notes patient's medications are stable will continue with atorvastatin at this time discontinue fish oil  - atorvastatin (LIPITOR) 40 MG tablet; Take 1 tablet (40 mg total) by mouth at bedtime.  Dispense: 90 tablet; Refill: 2    4. Gastroesophageal reflux disease without esophagitis  Patient is done a trial off acid reflux medications in the past without improvement will continue with omeprazole at this time    5. Coronary artery disease involving native coronary artery of native heart without angina pectoris  Symptoms are stable and doing well continue a statin and aspirin at this point blood pressure well controlled    6. Eye twitch  Patient is a noticing involuntary twitching upper and lower eyelid for some time would like to see ophthalmology has an ophthalmologist in mind and will contact me if they do not find anything for referral to neurology    The patient's current medical problems were reviewed.    The following high BMI interventions were performed this visit: encouragement to exercise and weight monitoring  The following health maintenance schedule was reviewed with the patient and provided in printed form in the after visit summary:   Health Maintenance   Topic Date Due     ADVANCE DIRECTIVES DISCUSSED WITH PATIENT  01/20/2010     FALL RISK ASSESSMENT  04/07/2018     TD 18+ HE  12/19/2024     COLONOSCOPY  08/31/2027     PNEUMOCOCCAL POLYSACCHARIDE VACCINE AGE 65 AND OVER  Completed     INFLUENZA VACCINE RULE BASED  Completed     PNEUMOCOCCAL CONJUGATE VACCINE FOR ADULTS  (PCV13 OR PREVNAR)  Completed     ZOSTER VACCINE  Completed        Subjective:   Chief Complaint: Javier Johnson is an 73 y.o. male here for an Annual Wellness visit.   HPI: Patient is here for yearly exam.  Patient is fasting interest in fasting labs.  Patient is up-to-date and immunizations will check with shingles vaccine coverage.  Patient is up-to-date on colon cancer screening.  Patient has no acute concerns.  He is doing well current medications.  He has noted upper and lower eyelid twitching that has been occurring over the last few months like to have this evaluated we discussed seeing ophthalmology at first and if no etiology found consideration referral to neurology.    Review of Systems:   Please see above.  The rest of the review of systems are negative for all systems.    Patient Care Team:  Reza Yang MD as PCP - General     Patient Active Problem List   Diagnosis     Hyperplastic Polyp Of The Large Intestine     Hyperlipemia     Varicose Veins     Esophageal Reflux     Diverticulosis     Benign Prostatic Hypertrophy     Midback Pain     Nicotine Dependence - In Remission     Lower Back Pain     Inguinal Hernia     Prostate cancer     Coronary artery disease involving native coronary artery     Abnormal stress test     Past Medical History:   Diagnosis Date     Cancer      Exertional chest pain      GERD (gastroesophageal reflux disease)      Hyperlipidemia      Prostate cancer       Past Surgical History:   Procedure Laterality Date     CARDIAC CATHETERIZATION  08/03/2016     CORONARY STENT PLACEMENT  08/03/2016    2 klaus to om     HERNIA REPAIR       PROSTATE SURGERY        Family History   Problem Relation Age of Onset     Stroke Mother      Coronary artery disease Father       Social History     Social History     Marital status:      Spouse name: N/A     Number of children: N/A     Years of education: N/A     Occupational History     Not on file.     Social History Main Topics  "    Smoking status: Former Smoker     Quit date: 1/1/1991     Smokeless tobacco: Never Used      Comment: quit 1990     Alcohol use 3.0 oz/week     1 Glasses of wine, 2 Cans of beer, 2 Standard drinks or equivalent per week     Drug use: No     Sexual activity: Not on file     Other Topics Concern     Not on file     Social History Narrative      Current Outpatient Prescriptions   Medication Sig Dispense Refill     ascorbic acid (ASCORBIC ACID WITH ARTUR HIPS) 500 MG tablet Take 1,000 mg by mouth.        aspirin 81 MG EC tablet Take 81 mg by mouth bedtime.        atorvastatin (LIPITOR) 40 MG tablet Take 1 tablet (40 mg total) by mouth at bedtime. 90 tablet 2     cholecalciferol, vitamin D3, 2,000 unit cap Take by mouth daily.        GLUCOSAMINE SULFATE (GLUCOSAMINE ORAL) Take 1 tablet by mouth daily.        MULTIVIT &MINERALS/FERROUS FUM (MULTI VITAMIN ORAL) Take 1 tablet by mouth daily.       nitroglycerin (NITROSTAT) 0.4 MG SL tablet Place 1 tablet (0.4 mg total) under the tongue every 5 (five) minutes as needed for chest pain. 90 tablet 12     OMEGA-3/DHA/EPA/FISH OIL (FISH OIL-OMEGA-3 FATTY ACIDS) 300-1,000 mg capsule Take 1,000 mg by mouth 2 (two) times a day.        omeprazole (PRILOSEC OTC) 20 MG tablet Take 20 mg by mouth daily.       No current facility-administered medications for this visit.       Objective:   Vital Signs:   Visit Vitals     /78 (Patient Site: Left Arm, Patient Position: Sitting, Cuff Size: Adult Regular)     Pulse 72     Temp 97.6  F (36.4  C) (Oral)     Resp 16     Ht 5' 9.49\" (1.765 m)     Wt 161 lb 3.2 oz (73.1 kg)     BMI 23.47 kg/m2        VisionScreening:  No exam data present     PHYSICAL EXAM  Physical Examination: General appearance - alert, well appearing, and in no distress  Mental status - alert, oriented to person, place, and time  Eyes - pupils equal and reactive, extraocular eye movements intact- pt has involuntary ticking of upper and lower eyelid periodically  Ears " - bilateral TM's and external ear canals normal  Nose - normal and patent, no erythema, discharge or polyps  Mouth - mucous membranes moist, pharynx normal without lesions  Lymphatics - no palpable lymphadenopathy  Chest - clear to auscultation, no wheezes, rales or rhonchi, symmetric air entry  Heart - normal rate, regular rhythm, normal S1, S2, no murmurs, rubs, clicks or gallops  Abdomen - soft, nontender, nondistended, no masses or organomegaly  Back exam - full range of motion, no tenderness, palpable spasm or pain on motion  Neurological - alert, oriented, normal speech, no focal findings or movement disorder noted  Musculoskeletal - no joint tenderness, deformity or swelling  Extremities - peripheral pulses normal, no pedal edema, no clubbing or cyanosis  Skin - normal coloration and turgor, no rashes, no suspicious skin lesions noted      Assessment Results 4/10/2018   Activities of Daily Living No help needed   Instrumental Activities of Daily Living No help needed   Mini Cog Total Score 5   Some recent data might be hidden     A Mini-Cog score of 0-2 suggests the possibility of dementia, score of 3-5 suggests no dementia    Identified Health Risks:     Patient's advanced directive was discussed and I am comfortable with the patient's wishes.

## 2021-06-17 NOTE — PROGRESS NOTES
"Cardiology Progress Note    Assessment:  Coronary artery disease status post obtuse marginal stenting in August 2016, stable, no angina  Hypercholesterolemia, good control      Plan:  Continue current cardiac medications.  I do not think that there is enough data to support routine use of fish oil supplements.  He can stop it if he wants to.  I encouraged him to stay physically active  Follow-up in 2 years    Subjective:   This is 73 y.o. male who comes in today for follow-up visit.  He has done well.  He has been physically active. He rides bicycle, swims or goals for hikes a few times a week.  He has never had any chest pain with those activities.  He is compliant with cardiac medications.    Review of Systems:   General: WNL  Eyes: WNL  Ears/Nose/Throat: WNL  Lungs: WNL  Heart: WNL  Stomach: WNL  Bladder: WNL  Muscle/Joints: WNL  Skin: WNL  Nervous System: WNL  Mental Health: WNL     Blood: WNL    Objective:   /82 (Patient Site: Left Arm, Patient Position: Sitting, Cuff Size: Adult Regular)  Pulse 68  Resp 16  Ht 5' 9.5\" (1.765 m)  Wt 166 lb (75.3 kg) Comment: with shoes  BMI 24.16 kg/m2  Physical Exam:  GENERAL: no distress  NECK: No JVD  LUNGS: Clear to auscultation.  CARDIAC: regular rhythm, S1 & S2 normal.  No heaves, thrills, gallops or murmurs.  ABDOMEN: flat, negative hepatosplenomegaly, soft and non-tender.  EXTREMITIES: No evidence of cyanosis, clubbing or edema.    Current Outpatient Prescriptions   Medication Sig Dispense Refill     ascorbic acid (ASCORBIC ACID WITH ARTUR HIPS) 500 MG tablet Take 1,000 mg by mouth.        aspirin 81 MG EC tablet Take 81 mg by mouth bedtime.        atorvastatin (LIPITOR) 40 MG tablet Take 1 tablet (40 mg total) by mouth at bedtime. 90 tablet 2     cholecalciferol, vitamin D3, 2,000 unit cap Take by mouth daily.        GLUCOSAMINE SULFATE (GLUCOSAMINE ORAL) Take 1 tablet by mouth daily.        MULTIVIT &MINERALS/FERROUS FUM (MULTI VITAMIN ORAL) Take 1 tablet by " mouth daily.       nitroglycerin (NITROSTAT) 0.4 MG SL tablet Place 1 tablet (0.4 mg total) under the tongue every 5 (five) minutes as needed for chest pain. 90 tablet 12     OMEGA-3/DHA/EPA/FISH OIL (FISH OIL-OMEGA-3 FATTY ACIDS) 300-1,000 mg capsule Take 1,000 mg by mouth 2 (two) times a day.        omeprazole (PRILOSEC OTC) 20 MG tablet Take 20 mg by mouth daily.       No current facility-administered medications for this visit.        Cardiographics:    Coronary angio: August 2016   1v CAD.  99% OM1 stenosis with CINTHYA grade 2 flow.  Normal LV systolic function with a visually estimated EF  60-65%.  Mild Mitral regurgitation.  Inferolateral hypokinesis.  Successful Synergy KEN stent of OM1.    Lab Results:       Lab Results   Component Value Date    CHOL 133 04/07/2017    CHOL 133 10/27/2016    CHOL 154 08/04/2016     Lab Results   Component Value Date    HDL 45 04/07/2017    HDL 45 10/27/2016    HDL 40 08/04/2016     Lab Results   Component Value Date    LDLCALC 67 04/07/2017    LDLCALC 69 10/27/2016    LDLCALC 86 08/04/2016     Lab Results   Component Value Date    TRIG 105 04/07/2017    TRIG 94 10/27/2016    TRIG 140 08/04/2016     No components found for: CHOLHDL  No results found for: BNP    Barrie (Ryan)  MD Jaylen

## 2021-06-20 NOTE — LETTER
Letter by Barrie York MD (Ted) at      Author: Barrie York MD (Ted) Service: -- Author Type: --    Filed:  Encounter Date: 5/4/2020 Status: (Other)         Javier Johnson  [address] [facility] [address]      May 4, 2020      Dear Javier,    This letter is to remind you that you will be due for your follow up appointment with Dr. Ryan York in May, 2020 . To help ensure you are in the best health possible, a regular follow-up with your cardiologist is essential.     Please call our Patient Scheduling Line at [phone] to schedule your appointment at your earliest convenience.  If you have recently scheduled an appointment, please disregard this letter.    We look forward to seeing you again. As always, we are available at the number  above for any questions or concerns you may have.      Sincerely,     The Physicians and Staff of [facility]

## 2021-06-23 NOTE — TELEPHONE ENCOUNTER
Refill Approved    Rx renewed per Medication Renewal Policy. Medication was last renewed on 4/10/18.    Last office visit 4/10/18    Akira Gomez, [facility] Connection Triage/Med Refill 1/29/2019     Requested Prescriptions   Pending Prescriptions Disp Refills     atorvastatin (LIPITOR) 40 MG tablet [Pharmacy Med Name: ATORVASTATIN 40MG   TAB] 90 tablet 2     Sig: TAKE 1 TABLET BY MOUTH ONCE DAILY AT BEDTIME    Statins Refill Protocol (Hmg CoA Reductase Inhibitors) Passed - 1/29/2019  2:19 PM       Passed - PCP or prescribing provider visit in past 12 months     Last office visit with prescriber/PCP: 8/11/2016 Reza Yang MD OR same dept: Visit date not found OR same specialty: 8/11/2016 Reza Yang MD  Last physical: 4/10/2018 Last MTM visit: Visit date not found   Next visit within 3 mo: Visit date not found  Next physical within 3 mo: Visit date not found  Prescriber OR PCP: Reza Yang MD  Last diagnosis associated with med order: 1. Hyperlipemia  - atorvastatin (LIPITOR) 40 MG tablet [Pharmacy Med Name: ATORVASTATIN 40MG   TAB]; TAKE 1 TABLET BY MOUTH ONCE DAILY AT BEDTIME  Dispense: 90 tablet; Refill: 2    If protocol passes may refill for 12 months if within 3 months of last provider visit (or a total of 15 months).

## 2021-06-26 ENCOUNTER — HEALTH MAINTENANCE LETTER (OUTPATIENT)
Age: 76
End: 2021-06-26

## 2021-07-25 ENCOUNTER — TRANSFERRED RECORDS (OUTPATIENT)
Dept: HEALTH INFORMATION MANAGEMENT | Facility: CLINIC | Age: 76
End: 2021-07-25

## 2021-08-09 ASSESSMENT — ACTIVITIES OF DAILY LIVING (ADL): CURRENT_FUNCTION: NO ASSISTANCE NEEDED

## 2021-08-13 ENCOUNTER — OFFICE VISIT (OUTPATIENT)
Dept: FAMILY MEDICINE | Facility: CLINIC | Age: 76
End: 2021-08-13
Payer: COMMERCIAL

## 2021-08-13 VITALS
BODY MASS INDEX: 23.55 KG/M2 | WEIGHT: 159 LBS | HEIGHT: 69 IN | SYSTOLIC BLOOD PRESSURE: 134 MMHG | HEART RATE: 68 BPM | DIASTOLIC BLOOD PRESSURE: 81 MMHG | RESPIRATION RATE: 16 BRPM

## 2021-08-13 DIAGNOSIS — G89.29 CHRONIC PAIN OF LEFT KNEE: ICD-10-CM

## 2021-08-13 DIAGNOSIS — Z12.5 ENCOUNTER FOR SCREENING FOR MALIGNANT NEOPLASM OF PROSTATE: ICD-10-CM

## 2021-08-13 DIAGNOSIS — E78.2 MIXED HYPERLIPIDEMIA: ICD-10-CM

## 2021-08-13 DIAGNOSIS — Z00.00 HEALTH CARE MAINTENANCE: Primary | ICD-10-CM

## 2021-08-13 DIAGNOSIS — M25.562 CHRONIC PAIN OF LEFT KNEE: ICD-10-CM

## 2021-08-13 LAB
ALBUMIN SERPL-MCNC: 4.2 G/DL (ref 3.5–5)
ALP SERPL-CCNC: 64 U/L (ref 45–120)
ALT SERPL W P-5'-P-CCNC: 29 U/L (ref 0–45)
ANION GAP SERPL CALCULATED.3IONS-SCNC: 11 MMOL/L (ref 5–18)
AST SERPL W P-5'-P-CCNC: 26 U/L (ref 0–40)
BILIRUB SERPL-MCNC: 0.9 MG/DL (ref 0–1)
BUN SERPL-MCNC: 16 MG/DL (ref 8–28)
CALCIUM SERPL-MCNC: 10 MG/DL (ref 8.5–10.5)
CHLORIDE BLD-SCNC: 104 MMOL/L (ref 98–107)
CHOLEST SERPL-MCNC: 145 MG/DL
CO2 SERPL-SCNC: 26 MMOL/L (ref 22–31)
CREAT SERPL-MCNC: 0.91 MG/DL (ref 0.7–1.3)
ERYTHROCYTE [DISTWIDTH] IN BLOOD BY AUTOMATED COUNT: 12.3 % (ref 10–15)
FASTING STATUS PATIENT QL REPORTED: YES
GFR SERPL CREATININE-BSD FRML MDRD: 83 ML/MIN/1.73M2
GLUCOSE BLD-MCNC: 98 MG/DL (ref 70–125)
HCT VFR BLD AUTO: 46.1 % (ref 40–53)
HDLC SERPL-MCNC: 52 MG/DL
HGB BLD-MCNC: 15.5 G/DL (ref 13.3–17.7)
LDLC SERPL CALC-MCNC: 60 MG/DL
MCH RBC QN AUTO: 30.9 PG (ref 26.5–33)
MCHC RBC AUTO-ENTMCNC: 33.6 G/DL (ref 31.5–36.5)
MCV RBC AUTO: 92 FL (ref 78–100)
PLATELET # BLD AUTO: 217 10E3/UL (ref 150–450)
POTASSIUM BLD-SCNC: 4.1 MMOL/L (ref 3.5–5)
PROT SERPL-MCNC: 7 G/DL (ref 6–8)
PSA SERPL-MCNC: <0.1 UG/L (ref 0–6.5)
RBC # BLD AUTO: 5.01 10E6/UL (ref 4.4–5.9)
SODIUM SERPL-SCNC: 141 MMOL/L (ref 136–145)
TRIGL SERPL-MCNC: 167 MG/DL
WBC # BLD AUTO: 6.7 10E3/UL (ref 4–11)

## 2021-08-13 PROCEDURE — G0103 PSA SCREENING: HCPCS | Performed by: FAMILY MEDICINE

## 2021-08-13 PROCEDURE — 99213 OFFICE O/P EST LOW 20 MIN: CPT | Mod: 25 | Performed by: FAMILY MEDICINE

## 2021-08-13 PROCEDURE — 80061 LIPID PANEL: CPT | Performed by: FAMILY MEDICINE

## 2021-08-13 PROCEDURE — 99397 PER PM REEVAL EST PAT 65+ YR: CPT | Performed by: FAMILY MEDICINE

## 2021-08-13 PROCEDURE — 85027 COMPLETE CBC AUTOMATED: CPT | Performed by: FAMILY MEDICINE

## 2021-08-13 PROCEDURE — 36415 COLL VENOUS BLD VENIPUNCTURE: CPT | Performed by: FAMILY MEDICINE

## 2021-08-13 PROCEDURE — 80053 COMPREHEN METABOLIC PANEL: CPT | Performed by: FAMILY MEDICINE

## 2021-08-13 RX ORDER — ATORVASTATIN CALCIUM 40 MG/1
TABLET, FILM COATED ORAL
Qty: 90 TABLET | Refills: 3 | Status: SHIPPED | OUTPATIENT
Start: 2021-08-13

## 2021-08-13 ASSESSMENT — MIFFLIN-ST. JEOR: SCORE: 1454.34

## 2021-08-13 ASSESSMENT — ACTIVITIES OF DAILY LIVING (ADL): CURRENT_FUNCTION: NO ASSISTANCE NEEDED

## 2021-08-13 NOTE — PROGRESS NOTES
"SUBJECTIVE:   Javier Johnson is a 76 year old male who presents for Preventive Visit.  Patient is here for annual exam  Fasting blood work be obtained today and will follow based on results  Patient is a history of prostate cancer is greater than 5 years out we are monitoring with PSA levels.  Patient is on statin therapy will check cholesterol levels refills of his statin sent to the pharmacy.  Patient has ongoing problems with left-sided knee pain for greater than a year he would like to see orthopedics for evaluation and referral was placed to [facility].    Patient is selling his home in [place] planning on moving to [place].  Patient has biking on a regular basis.    Patient has been advised of split billing requirements and indicates understanding: Yes   Are you in the first 12 months of your Medicare coverage?  No    Healthy Habits:     In general, how would you rate your overall health?  Excellent    Frequency of exercise:  6-7 days/week    Duration of exercise:  45-60 minutes    Do you usually eat at least 4 servings of fruit and vegetables a day, include whole grains    & fiber and avoid regularly eating high fat or \"junk\" foods?  Yes    Taking medications regularly:  Yes    Medication side effects:  None    Ability to successfully perform activities of daily living:  No assistance needed    Home Safety:  No safety concerns identified    Hearing Impairment:  No hearing concerns    In the past 6 months, have you been bothered by leaking of urine?  No    In general, how would you rate your overall mental or emotional health?  Excellent      PHQ-2 Total Score: 0    Additional concerns today:  No    Do you feel safe in your environment? Yes    Have you ever done Advance Care Planning? (For example, a Health Directive, POLST, or a discussion with a medical provider or your loved ones about your wishes): Yes, patient states has an Advance Care Planning document and will bring a copy to the clinic.   " "  Fall risk  Fallen 2 or more times in the past year?: No  Any fall with injury in the past year?: No    Cognitive Screening   1) Repeat 3 items (Leader, Season, Table)    2) Clock draw: NORMAL  3) 3 item recall: Recalls 3 objects  Results: 3 items recalled: COGNITIVE IMPAIRMENT LESS LIKELY    Mini-CogTM Copyright S Gwen. Licensed by the author for use in [facility]; reprinted with permission (sushant@[facility]). All rights reserved.      Do you have sleep apnea, excessive snoring or daytime drowsiness?: no    Reviewed and updated as needed this visit by clinical staff  Tobacco  Allergies  Meds              Reviewed and updated as needed this visit by Provider                Social History     Tobacco Use     Smoking status: Former Smoker     Quit date: 1991     Years since quittin.6     Smokeless tobacco: Never Used     Tobacco comment: quit    Substance Use Topics     Alcohol use: Yes     Alcohol/week: 5.0 standard drinks         Alcohol Use 2021   Prescreen: >3 drinks/day or >7 drinks/week? No         Current providers sharing in care for this patient include:   Patient Care Team:  Reza Yang MD as PCP - General (Family Practice)  Reza Yang MD as Assigned PCP    The following health maintenance items are reviewed in Epic and correct as of today:  Health Maintenance Due   Topic Date Due     ANNUAL REVIEW OF  ORDERS  Never done     HEPATITIS C SCREENING  Never done     ZOSTER IMMUNIZATION (2 of 3) 2008     AORTIC ANEURYSM SCREENING (SYSTEM ASSIGNED)  Never done     FALL RISK ASSESSMENT  2021     MEDICARE ANNUAL WELLNESS VISIT  2021     Lab work is in process          Review of Systems  Constitutional, HEENT, cardiovascular, pulmonary, gi and gu systems are negative, except as otherwise noted.    OBJECTIVE:   /81 (BP Location: Left arm, Patient Position: Sitting, Cuff Size: Adult Regular)   Pulse 68   Resp 16   Ht 1.757 m (5' 9.17\")   Wt " "72.1 kg (159 lb)   BMI 23.36 kg/m   Estimated body mass index is 23.36 kg/m  as calculated from the following:    Height as of this encounter: 1.757 m (5' 9.17\").    Weight as of this encounter: 72.1 kg (159 lb).  Physical Exam  GENERAL: healthy, alert and no distress  RESP: lungs clear to auscultation - no rales, rhonchi or wheezes  CV: regular rate and rhythm, normal S1 S2, no S3 or S4, no murmur, click or rub, no peripheral edema and peripheral pulses strong  ABDOMEN: soft, nontender, no hepatosplenomegaly, no masses and bowel sounds normal  MS: no gross musculoskeletal defects noted, no edema  NEURO: Normal strength and tone, mentation intact and speech normal  PSYCH: mentation appears normal, affect normal/bright    Diagnostic Test Results:  Labs reviewed in Epic    ASSESSMENT / PLAN:   1. [facility]  Patient is here for annual exam will obtain blood work and follow-up based on results  - CBC with platelets; Future  - Comprehensive metabolic panel; Future  - Lipid panel reflex to direct LDL Fasting; Future  - Prostate Specific Antigen Screen; Future  - CBC with platelets  - Comprehensive metabolic panel  - Lipid panel reflex to direct LDL Fasting  - Prostate Specific Antigen Screen    2. Encounter for screening for malignant neoplasm of prostate   History of prostate cancer screening for PSA  - Prostate Specific Antigen Screen; Future  - Prostate Specific Antigen Screen    3. Mixed hyperlipidemia  Patient is on statin therapy check cholesterol  - atorvastatin (LIPITOR) 40 MG tablet; [ATORVASTATIN (LIPITOR) 40 MG TABLET] TAKE 1 TABLET BY MOUTH AT BEDTIME  Dispense: 90 tablet; Refill: 3    4. Chronic pain of left knee  Patient requesting referral to orthopedics for left knee evaluation  - Orthopedic  Referral; Future    Patient has been advised of split billing requirements and indicates understanding: Yes  COUNSELING:  Reviewed preventive health counseling, as reflected in patient " "instructions       Regular exercise       Healthy diet/nutrition    Estimated body mass index is 23.36 kg/m  as calculated from the following:    Height as of this encounter: 1.757 m (5' 9.17\").    Weight as of this encounter: 72.1 kg (159 lb).        He reports that he quit smoking about 30 years ago. He has never used smokeless tobacco.      Appropriate preventive services were discussed with this patient, including applicable screening as appropriate for cardiovascular disease, diabetes, osteopenia/osteoporosis, and glaucoma.  As appropriate for age/gender, discussed screening for colorectal cancer, prostate cancer, breast cancer, and cervical cancer. Checklist reviewing preventive services available has been given to the patient.    Reviewed patients plan of care and provided an AVS. The Basic Care Plan (routine screening as documented in Health Maintenance) for Javier meets the Care Plan requirement. This Care Plan has been established and reviewed with the Patient.    Counseling Resources:  ATP IV Guidelines  Pooled Cohorts Equation Calculator  Breast Cancer Risk Calculator  Breast Cancer: Medication to Reduce Risk  FRAX Risk Assessment  ICSI Preventive Guidelines  Dietary Guidelines for Americans, 2010  USDA's MyPlate  ASA Prophylaxis  Lung CA Screening    Reza Yang MD  [facility]    Identified Health Risks:  "

## 2021-08-17 ENCOUNTER — TRANSFERRED RECORDS (OUTPATIENT)
Dept: HEALTH INFORMATION MANAGEMENT | Facility: CLINIC | Age: 76
End: 2021-08-17

## 2021-10-16 ENCOUNTER — HEALTH MAINTENANCE LETTER (OUTPATIENT)
Age: 76
End: 2021-10-16

## 2022-10-01 ENCOUNTER — HEALTH MAINTENANCE LETTER (OUTPATIENT)
Age: 77
End: 2022-10-01

## 2023-04-06 ENCOUNTER — APPOINTMENT (RX ONLY)
Dept: URBAN - METROPOLITAN AREA CLINIC 56 | Facility: CLINIC | Age: 78
Setting detail: DERMATOLOGY
End: 2023-04-06

## 2023-04-06 DIAGNOSIS — L57.0 ACTINIC KERATOSIS: ICD-10-CM

## 2023-04-06 DIAGNOSIS — D485 NEOPLASM OF UNCERTAIN BEHAVIOR OF SKIN: ICD-10-CM

## 2023-04-06 DIAGNOSIS — L81.4 OTHER MELANIN HYPERPIGMENTATION: ICD-10-CM

## 2023-04-06 DIAGNOSIS — L90.5 SCAR CONDITIONS AND FIBROSIS OF SKIN: ICD-10-CM

## 2023-04-06 PROBLEM — D48.5 NEOPLASM OF UNCERTAIN BEHAVIOR OF SKIN: Status: ACTIVE | Noted: 2023-04-06

## 2023-04-06 PROCEDURE — ? LIQUID NITROGEN

## 2023-04-06 PROCEDURE — ? BIOPSY BY SHAVE METHOD

## 2023-04-06 PROCEDURE — ? COUNSELING

## 2023-04-06 PROCEDURE — 99203 OFFICE O/P NEW LOW 30 MIN: CPT | Mod: 25

## 2023-04-06 PROCEDURE — ? ADDITIONAL NOTES

## 2023-04-06 PROCEDURE — 17000 DESTRUCT PREMALG LESION: CPT | Mod: 59

## 2023-04-06 PROCEDURE — 17003 DESTRUCT PREMALG LES 2-14: CPT

## 2023-04-06 PROCEDURE — 11102 TANGNTL BX SKIN SINGLE LES: CPT

## 2023-04-06 ASSESSMENT — LOCATION DETAILED DESCRIPTION DERM
LOCATION DETAILED: LEFT CENTRAL EYEBROW
LOCATION DETAILED: RIGHT SUPERIOR MEDIAL MALAR CHEEK
LOCATION DETAILED: RIGHT INFERIOR TEMPLE
LOCATION DETAILED: RIGHT LATERAL MALAR CHEEK
LOCATION DETAILED: RIGHT SUPERIOR FOREHEAD
LOCATION DETAILED: LEFT MEDIAL FRONTAL SCALP
LOCATION DETAILED: LEFT SUPERIOR FOREHEAD
LOCATION DETAILED: RIGHT SUPERIOR TEMPLE
LOCATION DETAILED: LEFT INFERIOR MEDIAL FOREHEAD

## 2023-04-06 ASSESSMENT — LOCATION SIMPLE DESCRIPTION DERM
LOCATION SIMPLE: RIGHT CHEEK
LOCATION SIMPLE: RIGHT TEMPLE
LOCATION SIMPLE: RIGHT FOREHEAD
LOCATION SIMPLE: LEFT FOREHEAD
LOCATION SIMPLE: RIGHT CHEEK
LOCATION SIMPLE: LEFT EYEBROW
LOCATION SIMPLE: LEFT SCALP

## 2023-04-06 ASSESSMENT — LOCATION ZONE DERM
LOCATION ZONE: FACE
LOCATION ZONE: SCALP
LOCATION ZONE: FACE

## 2023-04-06 NOTE — PROCEDURE: ADDITIONAL NOTES
Render Risk Assessment In Note?: no
Additional Notes: S/o ln2 of ISK by outside provider
Detail Level: Simple

## 2023-04-06 NOTE — PROCEDURE: LIQUID NITROGEN
Render Note In Bullet Format When Appropriate: No
Detail Level: Detailed
Number Of Freeze-Thaw Cycles: 1 freeze-thaw cycle
Show Aperture Variable?: Yes
Consent: The patient's consent was obtained including but not limited to risks of crusting, scabbing, blistering, scarring, darker or lighter pigmentary change, recurrence, incomplete removal and infection.
Post-Care Instructions: I reviewed with the patient in detail post-care instructions. Patient is to wear sunprotection, and avoid picking at any of the treated lesions. Pt may apply Vaseline to crusted or scabbing areas.
Duration Of Freeze Thaw-Cycle (Seconds): 3

## 2023-05-16 ENCOUNTER — APPOINTMENT (RX ONLY)
Dept: URBAN - METROPOLITAN AREA CLINIC 56 | Facility: CLINIC | Age: 78
Setting detail: DERMATOLOGY
End: 2023-05-16

## 2023-05-16 ENCOUNTER — APPOINTMENT (RX ONLY)
Dept: URBAN - METROPOLITAN AREA CLINIC 77 | Facility: CLINIC | Age: 78
Setting detail: DERMATOLOGY
End: 2023-05-16

## 2023-05-16 VITALS — DIASTOLIC BLOOD PRESSURE: 64 MMHG | SYSTOLIC BLOOD PRESSURE: 131 MMHG

## 2023-05-16 DIAGNOSIS — Z02.9 ENCOUNTER FOR ADMINISTRATIVE EXAMINATIONS, UNSPECIFIED: ICD-10-CM

## 2023-05-16 PROBLEM — C44.319 BASAL CELL CARCINOMA OF SKIN OF OTHER PARTS OF FACE: Status: ACTIVE | Noted: 2023-05-16

## 2023-05-16 PROCEDURE — ? REPAIR NOTE

## 2023-05-16 PROCEDURE — ? MOHS SURGERY

## 2023-05-16 PROCEDURE — 14041 TIS TRNFR F/C/C/M/N/A/G/H/F: CPT

## 2023-05-16 PROCEDURE — 17311 MOHS 1 STAGE H/N/HF/G: CPT

## 2023-05-16 PROCEDURE — 17312 MOHS ADDL STAGE: CPT

## 2023-05-16 NOTE — PROCEDURE: MOHS SURGERY
Spontaneous Peng Advancement Flap Text: The defect edges were debeveled with a #15 scalpel blade.  Given the location of the defect, shape of the defect and the proximity to free margins a Peng advancement flap was deemed most appropriate.  Using a sterile surgical marker, an appropriate advancement flap was drawn incorporating the defect and placing the expected incisions within the relaxed skin tension lines where possible. The area thus outlined was incised deep to adipose tissue with a #15 scalpel blade.  The skin margins were undermined to an appropriate distance in all directions utilizing iris scissors.

## 2023-05-16 NOTE — PROCEDURE: MOHS SURGERY
Referred To Mid-Level For Closure Text (Leave Blank If You Do Not Want): After obtaining clear surgical margins the patient was sent to Barron Cuello PA-C for surgical repair.  The patient understands they will receive post-surgical care and follow-up from the mid-level provider.

## 2023-10-15 ENCOUNTER — HEALTH MAINTENANCE LETTER (OUTPATIENT)
Age: 78
End: 2023-10-15
